# Patient Record
Sex: FEMALE | Race: WHITE
[De-identification: names, ages, dates, MRNs, and addresses within clinical notes are randomized per-mention and may not be internally consistent; named-entity substitution may affect disease eponyms.]

---

## 2019-01-05 ENCOUNTER — HOSPITAL ENCOUNTER (EMERGENCY)
Dept: HOSPITAL 97 - ER | Age: 34
Discharge: HOME | End: 2019-01-05
Payer: COMMERCIAL

## 2019-01-05 DIAGNOSIS — H81.10: Primary | ICD-10-CM

## 2019-01-05 PROCEDURE — 96374 THER/PROPH/DIAG INJ IV PUSH: CPT

## 2019-01-05 PROCEDURE — 96361 HYDRATE IV INFUSION ADD-ON: CPT

## 2019-01-05 PROCEDURE — 70450 CT HEAD/BRAIN W/O DYE: CPT

## 2019-01-05 PROCEDURE — 96375 TX/PRO/DX INJ NEW DRUG ADDON: CPT

## 2019-01-05 PROCEDURE — 81003 URINALYSIS AUTO W/O SCOPE: CPT

## 2019-01-05 PROCEDURE — 81025 URINE PREGNANCY TEST: CPT

## 2019-01-05 PROCEDURE — 99284 EMERGENCY DEPT VISIT MOD MDM: CPT

## 2019-01-05 NOTE — EDPHYS
Physician Documentation                                                                           

 [facility]                                                                

Name: Dominique Pedro                                                                               

Age: 33 yrs                                                                                       

Sex: Female                                                                                       

: 1985                                                                                   

MRN: [id number]                                                                                   

Arrival Date: 2019                                                                          

Time: 10:13                                                                                       

Account#: [id number]                                                                            

Bed 13                                                                                            

Private MD:                                                                                       

ED Physician Mathew Olivia                                                                      

HPI:                                                                                              

                                                                                             

11:00 This 33 yrs old  Female presents to ER via Ambulatory with complaints of       pm1 

      Headache, Dizziness, Numbness Of Face.                                                      

11:00 The patient complains of pain to the forehead. The patient describes the headache as    pm1 

      constant. Onset: The symptoms/episode began/occurred last night. Associated signs and       

      symptoms: Pertinent positives: Vertigo room spinning, Pertinent negatives: altered          

      mental status, fever, focal deficits. The symptoms are alleviated by closing eyes and       

      staying still the symptoms are aggravated by movement of head . The patient has not         

      experienced similar symptoms in the past. Patient with upper respiratory complaints,        

      cough, runny nose and sinus congestion for the past few days.                               

                                                                                                  

OB/GYN:                                                                                           

10:33 LMP 2018                                                                          aj  

                                                                                                  

Historical:                                                                                       

- Allergies:                                                                                      

10:33 No Known Allergies;                                                                     aj  

- Home Meds:                                                                                      

10:33 Trazodone Oral [Active];                                                                aj  

- PMHx:                                                                                           

10:33 None;                                                                                   aj  

- PSHx:                                                                                           

10:33 Appendectomy; ;                                                                aj  

                                                                                                  

- Immunization history:: Adult Immunizations up to date.                                          

- Social history:: Smoking status: Patient/guardian denies using tobacco.                         

- Ebola Screening: : Patient negative for fever greater than or equal to 101.5 degrees            

  Fahrenheit, and additional compatible Ebola Virus Disease symptoms Patient denies               

  exposure to infectious person Patient denies travel to an Ebola-affected area in the            

  21 days before illness onset No symptoms or risks identified at this time.                      

                                                                                                  

                                                                                                  

ROS:                                                                                              

11:00 Constitutional: Negative for fever, chills, and weight loss, Eyes: Negative for injury, pm1 

      pain, redness, and discharge, ENT: Negative for injury, pain, and discharge, Neck:          

      Negative for injury, pain, and swelling, Cardiovascular: Negative for chest pain,           

      palpitations, and edema, Respiratory: Negative for shortness of breath, cough,              

      wheezing, and pleuritic chest pain, Abdomen/GI: Negative for abdominal pain, nausea,        

      vomiting, diarrhea, and constipation, Back: Negative for injury and pain, : Negative      

      for injury, bleeding, discharge, and swelling, MS/Extremity: Negative for injury and        

      deformity, Skin: Negative for injury, rash, and discoloration.                              

11:00 Psych: Negative for depression, anxiety, suicide ideation, homicidal ideation, and          

      hallucinations.                                                                             

11:00 Neuro: Positive for vertigo, tingling of left side of face, Negative for altered mental     

      status.                                                                                     

                                                                                                  

Exam:                                                                                             

11:00 Constitutional:  This is a well developed, well nourished patient who is awake, alert,  pm1 

      and in no acute distress. Head/Face:  Normocephalic, atraumatic. Eyes:  Pupils equal        

      round and reactive to light, extra-ocular motions intact.  Lids and lashes normal.          

      Conjunctiva and sclera are non-icteric and not injected.  Cornea within normal limits.      

      Periorbital areas with no swelling, redness, or edema. ENT:  Nares patent. No nasal         

      discharge, no septal abnormalities noted.  Tympanic membranes are normal and external       

      auditory canals are clear.  Oropharynx with no redness, swelling, or masses, exudates,      

      or evidence of obstruction, uvula midline.  Mucous membranes moist. Neck:  Trachea          

      midline, no thyromegaly or masses palpated, and no cervical lymphadenopathy.  Supple,       

      full range of motion without nuchal rigidity, or vertebral point tenderness.  No            

      Meningismus. Chest/axilla:  Normal chest wall appearance and motion.  Nontender with no     

      deformity.  No lesions are appreciated. Cardiovascular:  Regular rate and rhythm with a     

      normal S1 and S2.  No gallops, murmurs, or rubs.  Normal PMI, no JVD.  No pulse             

      deficits. Respiratory:  Lungs have equal breath sounds bilaterally, clear to                

      auscultation and percussion.  No rales, rhonchi or wheezes noted.  No increased work of     

      breathing, no retractions or nasal flaring. Abdomen/GI:  Soft, non-tender, with normal      

      bowel sounds.  No distension or tympany.  No guarding or rebound.  No evidence of           

      tenderness throughout. Back:  No spinal tenderness.  No costovertebral tenderness.          

      Full range of motion. Skin:  Warm, dry with normal turgor.  Normal color with no            

      rashes, no lesions, and no evidence of cellulitis. MS/ Extremity:  Pulses equal, no         

      cyanosis.  Neurovascular intact.  Full, normal range of motion.                             

11:00 Neuro: Orientation: is normal, Mentation: is normal, Cranial nerves: normal except          

      vestibular nystagmus, Cerebellar function: normal finger to nose testing, heel to shin      

      testing is normal, Motor: moves all fours, strength is normal, strength is 5/5 in all       

      extremities, Sensation: is normal, no obvious gross deficits, Phani Hallpike test             

      positive.  Patient with rapid onset of vertigo, nausea with maneuver.  Patient closed       

      eyes and symptoms resolved in less than 15 seconds.                                         

                                                                                                  

Vital Signs:                                                                                      

10:33  / 78; Pulse 98; Resp 20; Temp 97.5; Pulse Ox 98% on R/A; Weight 83.91 kg; Height aj  

      5 ft. 5 in. (165.10 cm);                                                                    

12:04  / 81; Pulse 86; Resp 18; Temp 97.6(O); Pulse Ox 99% on R/A;                      mh5 

14:00  / 80; Pulse 79; Resp 16 S; Pulse Ox 100% on R/A;                                 jl7 

10:33 Body Mass Index 30.79 (83.91 kg, 165.10 cm)                                             aj  

                                                                                                  

MDM:                                                                                              

10:37 Patient medically screened.                                                             [facility] 

14:13 Data reviewed: vital signs. Data interpreted: Pulse oximetry: on room air is 99 %.      pm1 

      Interpretation: normal. Counseling: I had a detailed discussion with the patient and/or     

      guardian regarding: the historical points, exam findings, and any diagnostic results        

      supporting the discharge/admit diagnosis, radiology results, the need for outpatient        

      follow up, to return to the emergency department if symptoms worsen or persist or if        

      there are any questions or concerns that arise at home.                                     

                                                                                                  

                                                                                             

11:15 Order name: Urine Dipstick--Ancillary (enter results)                                     

                                                                                             

11:15 Order name: Urine Pregnancy--Ancillary (enter results); Complete Time: 12:12            ag  

                                                                                             

10:47 Order name: CT Head Brain wo Cont; Complete Time: 12:12                                 pm1 

                                                                                             

11:16 Order name: Urine Dipstick-Ancillary; Complete Time: 12:12                              EDMS

                                                                                             

10:47 Order name: Urine Dipstick-Ancillary (obtain specimen); Complete Time: 11:11            pm1 

                                                                                             

10:47 Order name: Urine Pregnancy Test (obtain specimen); Complete Time: 11:11                pm1 

                                                                                             

12:17 Order name: IV Saline Lock; Complete Time: 13:16                                        pm1 

                                                                                                  

Administered Medications:                                                                         

10:48 Drug: Zofran 4 mg Route: PO;                                                            jl7 

11:11 Drug: Valium 5 mg Route: PO;                                                            jl7 

12:00 Follow up: Response: No adverse reaction; No change in condition                        jl7 

12:50 Drug: Meclizine 50 mg Route: PO;                                                        jl7 

13:45 Follow up: Response: No adverse reaction; Marked relief of symptoms                     jl7 

13:15 Drug: TORadol 30 mg Route: IVP; Site: right upper arm;                                  jl7 

14:00 Follow up: Response: No adverse reaction; Pain is decreased                             jl7 

13:15 Drug: Decadron - Dexamethasone 10 mg Route: IVP; Site: right upper arm;                 iw  

13:45 Follow up: Response: No adverse reaction; Pain is decreased                             jl7 

13:15 Drug: NS 0.9% 1000 ml Route: IV; Rate: 1000 ml; Site: right upper arm;                  iw  

14:40 Follow up: Response: No adverse reaction; IV Status: Completed infusion                 jl7 

                                                                                                  

                                                                                                  

Disposition:                                                                                      

19 14:18 Discharged to Home. Impression: Benign paroxysmal vertigo, Headache.               

- Condition is Stable.                                                                            

- Discharge Instructions: Benign Positional Vertigo, General Headache Without Cause,              

  Easy-to-Read.                                                                                   

- Prescriptions for Meclizine 25 mg Oral Tablet - take 1 tablet by ORAL route every 8             

  hours As needed; 30 tablet.                                                                     

- Medication Reconciliation Form, Thank You Letter form.                                          

- Follow up: Emergency Department; When: As needed; Reason: Worsening of condition.               

  Follow up: Private Physician; When: 2 - 3 days; Reason: Recheck today's complaints,             

  Continuance of care, Re-evaluation by your physician.                                           

- Problem is new.                                                                                 

- Symptoms have improved.                                                                         

                                                                                                  

                                                                                                  

                                                                                                  

Addendum:                                                                                         

2019                                                                                        

     08:04 Co-signature as Attending Physician, Mathew Olivia MD I agree with the assessment and  c
ha

           plan of care.                                                                          

                                                                                                  

Signatures:                                                                                       

Dispatcher [employer]                           EDJulianna Cooper RN RN aj Anderson, Corey, MD MD cha Williams, Irene, RN RN iw Marinas, Patrick, NP                    NP   pm1                                                  

Kelly Iglesias RN RN   jl7                                                  

                                                                                                  

Corrections: (The following items were deleted from the chart)                                    

                                                                                             

14:41 14:18 2019 14:18 Discharged to Home. Impression: Benign paroxysmal vertigo;       jl7 

      Headache. Condition is Stable. Forms are Medication Reconciliation Form, Thank You          

      Letter, Antibiotic Education, Prescription Opioid Use. Follow up: Emergency Department;     

      When: As needed; Reason: Worsening of condition. Follow up: Private Physician; When: 2      

      - 3 days; Reason: Recheck today's complaints, Continuance of care, Re-evaluation by         

      your physician. Problem is new. Symptoms have improved. pm1                                 

                                                                                                  

**************************************************************************************************

## 2019-01-05 NOTE — ER
Nurse's Notes                                                                                     

 [facility]                                                                

Name: Dominique Pedro                                                                               

Age: 33 yrs                                                                                       

Sex: Female                                                                                       

: 1985                                                                                   

MRN: [id number]                                                                                   

Arrival Date: 2019                                                                          

Time: 10:13                                                                                       

Account#: [id number]                                                                            

Bed 13                                                                                            

Private MD:                                                                                       

Diagnosis: Benign paroxysmal vertigo;Headache                                                     

                                                                                                  

Presentation:                                                                                     

                                                                                             

10:31 Presenting complaint: Patient states: Numbness and tingling to left side of face that   aj  

      started last night at 2200 with dizziness and headache. Face is equal and symmetrical.      

      Patient ambulated to triage with steady gait. Drove herself and two minor children to       

      emergency department after going to urgent care this AM for this complaint. Transition      

      of care: patient was not received from another setting of care. Onset of symptoms was       

      2019 at 22:00. Risk Assessment: Do you want to hurt yourself or someone         

      else? Patient reports no desire to harm self or others. Initial Sepsis Screen: Does the     

      patient meet any 2 criteria? No. Patient's initial sepsis screen is negative. Does the      

      patient have a suspected source of infection? No. Patient's initial sepsis screen is        

      negative. Care prior to arrival: None.                                                      

10:31 Method Of Arrival: Ambulatory                                                             

10:31 Acuity: JEFFREY 3                                                                           aj  

                                                                                                  

Triage Assessment:                                                                                

10:33 Headache History: The patient has had previous headaches. General: Appears in no        aj  

      apparent distress. comfortable, Behavior is calm, cooperative, appropriate for age.         

      Pain: Complains of pain in face and scalp. Neuro: Level of Consciousness is awake,          

      alert, obeys commands, Oriented to person, place, time, situation, Appropriate for age      

       are equal bilaterally Moves all extremities. Full function Gait is steady, Speech     

      is normal, Facial symmetry appears normal, Pupils are PERRLA, Reports dizziness,            

      headache. Respiratory: Airway is patent Respiratory effort is even, unlabored,              

      Respiratory pattern is regular, symmetrical. Derm: Skin is intact, is healthy with good     

      turgor, Skin is pink, warm \T\ dry. normal.                                                 

                                                                                                  

OB/GYN:                                                                                           

10:33 LMP 2018                                                                          aj  

                                                                                                  

Historical:                                                                                       

- Allergies:                                                                                      

10:33 No Known Allergies;                                                                     aj  

- Home Meds:                                                                                      

10:33 Trazodone Oral [Active];                                                                aj  

- PMHx:                                                                                           

10:33 None;                                                                                   aj  

- PSHx:                                                                                           

10:33 Appendectomy; ;                                                                aj  

                                                                                                  

- Immunization history:: Adult Immunizations up to date.                                          

- Social history:: Smoking status: Patient/guardian denies using tobacco.                         

- Ebola Screening: : Patient negative for fever greater than or equal to 101.5 degrees            

  Fahrenheit, and additional compatible Ebola Virus Disease symptoms Patient denies               

  exposure to infectious person Patient denies travel to an Ebola-affected area in the            

  21 days before illness onset No symptoms or risks identified at this time.                      

                                                                                                  

                                                                                                  

Screenin:00 Abuse screen: Denies threats or abuse. Denies injuries from another. Nutritional        jl7 

      screening: No deficits noted. Tuberculosis screening: No symptoms or risk factors           

      identified. Fall Risk None identified.                                                      

                                                                                                  

Assessment:                                                                                       

11:00 General: Appears in no apparent distress. uncomfortable, Behavior is calm, cooperative, jl7 

      appropriate for age. Pain: Complains of pain in headache Pain does not radiate. Pain        

      currently is 8 out of 10 on a pain scale. Quality of pain is described as throbbing, Is     

      continuous. Neuro: Level of Consciousness is awake, alert, obeys commands, Oriented to      

      person, place, time, situation, Gait is steady, Reports dizziness. Cardiovascular:          

      Patient's skin is warm and dry. Respiratory: Airway is patent Respiratory effort is         

      even, unlabored, Respiratory pattern is regular, symmetrical. GI: Reports nausea. :       

      No signs and/or symptoms were reported regarding the genitourinary system. EENT: No         

      signs and/or symptoms were reported regarding the EENT system. Derm: Skin is pink, warm     

      \T\ dry.                                                                                    

12:00 Reassessment: Patient appears in no apparent distress at this time. No changes from     jl7 

      previously documented assessment. Patient and/or family updated on plan of care and         

      expected duration. Pain level reassessed. Patient is alert, oriented x 3, equal             

      unlabored respirations, skin warm/dry/pink.                                                 

13:00 Reassessment: Patient appears in no apparent distress at this time. No changes from     jl7 

      previously documented assessment. Patient and/or family updated on plan of care and         

      expected duration. Pain level reassessed. Patient is alert, oriented x 3, equal             

      unlabored respirations, skin warm/dry/pink.                                                 

14:00 Reassessment: Patient appears in no apparent distress at this time. Patient and/or      jl7 

      family updated on plan of care and expected duration. Pain level reassessed. Patient is     

      alert, oriented x 3, equal unlabored respirations, skin warm/dry/pink. Patient states       

      feeling better. Patient states symptoms have improved.                                      

                                                                                                  

Vital Signs:                                                                                      

10:33  / 78; Pulse 98; Resp 20; Temp 97.5; Pulse Ox 98% on R/A; Weight 83.91 kg; Height aj  

      5 ft. 5 in. (165.10 cm);                                                                    

12:04  / 81; Pulse 86; Resp 18; Temp 97.6(O); Pulse Ox 99% on R/A;                      mh5 

14:00  / 80; Pulse 79; Resp 16 S; Pulse Ox 100% on R/A;                                 jl7 

10:33 Body Mass Index 30.79 (83.91 kg, 165.10 cm)                                             aj  

                                                                                                  

ED Course:                                                                                        

10:13 Patient arrived in ED.                                                                  rg4 

10:33 Triage completed.                                                                       aj  

10:33 Arm band placed on left wrist. Patient placed in an exam room.                          aj  

10:35 Guillermo Huston NP is PHCP.                                                           pm1 

10:35 Mathew Olivia MD is Attending Physician.                                             pm1 

10:38 Kelly Iglesias RN is Primary Nurse.                                                      jl7 

11:00 Patient has correct armband on for positive identification. Bed in low position. Call   jl7 

      light in reach. Side rails up X 1. Pulse ox on. NIBP on.                                    

11:31 CT Head Brain wo Cont In Process Unspecified.                                           EDMS

11:51 CT completed. Patient moved to CT via wheelchair. Patient moved back from CT.           kw1 

13:15 Inserted saline lock: 22 gauge in right upper arm, using aseptic technique.             iw  

14:39 No provider procedures requiring assistance completed. IV discontinued, intact,         jl7 

      bleeding controlled, No redness/swelling at site. Pressure dressing applied.                

                                                                                                  

Administered Medications:                                                                         

10:48 Drug: Zofran 4 mg Route: PO;                                                            jl7 

11:11 Drug: Valium 5 mg Route: PO;                                                            jl7 

12:00 Follow up: Response: No adverse reaction; No change in condition                        jl7 

12:50 Drug: Meclizine 50 mg Route: PO;                                                        jl7 

13:45 Follow up: Response: No adverse reaction; Marked relief of symptoms                     jl7 

13:15 Drug: TORadol 30 mg Route: IVP; Site: right upper arm;                                  jl7 

14:00 Follow up: Response: No adverse reaction; Pain is decreased                             jl7 

13:15 Drug: Decadron - Dexamethasone 10 mg Route: IVP; Site: right upper arm;                 iw  

13:45 Follow up: Response: No adverse reaction; Pain is decreased                             jl7 

13:15 Drug: NS 0.9% 1000 ml Route: IV; Rate: 1000 ml; Site: right upper arm;                  iw  

14:40 Follow up: Response: No adverse reaction; IV Status: Completed infusion                 jl7 

                                                                                                  

                                                                                                  

Outcome:                                                                                          

14:18 Discharge ordered by MD.                                                                pm1 

14:39 Discharged to home ambulatory.                                                          jl7 

14:39 Condition: stable                                                                           

14:39 Discharge instructions given to patient, Instructed on discharge instructions, follow       

      up and referral plans. medication usage, Demonstrated understanding of instructions,        

      follow-up care, medications, Prescriptions given X 1.                                       

14:41 Patient left the ED.                                                                    jl7 

                                                                                                  

Signatures:                                                                                       

Dispatcher [employer]                           Julianna Tidwell RN RN aj Williams, Irene, RN RN iw Marinas, Patrick, NP                    NP   pm1                                                  

Clarisa Conway Maria mh5 Leal, Jahala, RN RN   jl7                                                  

Annabelle Ramos                            kw1                                                  

                                                                                                  

**************************************************************************************************

## 2019-01-05 NOTE — RAD REPORT
EXAM DESCRIPTION:  CT - Head Brain Wo Cont - 1/5/2019 11:31 am

 

CLINICAL HISTORY:  Left-sided numbness

 

COMPARISON:  None.

 

TECHNIQUE:  Computed axial tomography of the head was obtained. IV contrast was not requested.

 

All CT scans are performed using dose optimization technique as appropriate and may include automated
 exposure control or mA/KV adjustment according to patient size.

 

FINDINGS:  An intracranial  bleed is not seen .

 

The ventricles are normal in caliber.

 

No extra-axial fluid collection is noted.

 

Fluid within the sinuses/ mastoids is not seen.

 

IMPRESSION:  No acute intracranial abnormality is seen. If patient's symptoms persist  MRI of the bra
in would be recommended.

## 2019-05-14 ENCOUNTER — HOSPITAL ENCOUNTER (OUTPATIENT)
Dept: HOSPITAL 88 - OR | Age: 34
Setting detail: OBSERVATION
LOS: 1 days | Discharge: HOME | End: 2019-05-15
Attending: OTOLARYNGOLOGY | Admitting: OTOLARYNGOLOGY
Payer: COMMERCIAL

## 2019-05-14 VITALS — SYSTOLIC BLOOD PRESSURE: 114 MMHG | DIASTOLIC BLOOD PRESSURE: 60 MMHG

## 2019-05-14 VITALS — WEIGHT: 205.5 LBS | BODY MASS INDEX: 34.24 KG/M2 | HEIGHT: 65 IN

## 2019-05-14 VITALS — SYSTOLIC BLOOD PRESSURE: 113 MMHG | DIASTOLIC BLOOD PRESSURE: 66 MMHG

## 2019-05-14 VITALS — DIASTOLIC BLOOD PRESSURE: 66 MMHG | SYSTOLIC BLOOD PRESSURE: 113 MMHG

## 2019-05-14 VITALS — DIASTOLIC BLOOD PRESSURE: 60 MMHG | SYSTOLIC BLOOD PRESSURE: 114 MMHG

## 2019-05-14 DIAGNOSIS — E04.1: Primary | ICD-10-CM

## 2019-05-14 PROCEDURE — 36415 COLL VENOUS BLD VENIPUNCTURE: CPT

## 2019-05-14 PROCEDURE — 88307 TISSUE EXAM BY PATHOLOGIST: CPT

## 2019-05-14 PROCEDURE — 60212 PARTIAL THYROID EXCISION: CPT

## 2019-05-14 PROCEDURE — 88172 CYTP DX EVAL FNA 1ST EA SITE: CPT

## 2019-05-14 PROCEDURE — 82040 ASSAY OF SERUM ALBUMIN: CPT

## 2019-05-14 PROCEDURE — 88305 TISSUE EXAM BY PATHOLOGIST: CPT

## 2019-05-14 PROCEDURE — 82310 ASSAY OF CALCIUM: CPT

## 2019-05-14 PROCEDURE — 88331 PATH CONSLTJ SURG 1 BLK 1SPC: CPT

## 2019-05-14 PROCEDURE — 81025 URINE PREGNANCY TEST: CPT

## 2019-05-14 PROCEDURE — 88173 CYTOPATH EVAL FNA REPORT: CPT

## 2019-05-14 RX ADMIN — MEPERIDINE HYDROCHLORIDE PRN MG: 25 INJECTION, SOLUTION INTRAMUSCULAR; INTRAVENOUS; SUBCUTANEOUS at 21:10

## 2019-05-14 RX ADMIN — ACETAMINOPHEN AND CODEINE PHOSPHATE PRN EA: 300; 30 TABLET ORAL at 19:00

## 2019-05-14 RX ADMIN — SODIUM CHLORIDE, POTASSIUM CHLORIDE, SODIUM LACTATE AND CALCIUM CHLORIDE SCH MLS/HR: 600; 310; 30; 20 INJECTION, SOLUTION INTRAVENOUS at 16:15

## 2019-05-14 RX ADMIN — SULFAMETHOXAZOLE AND TRIMETHOPRIM SCH EA: 800; 160 TABLET ORAL at 21:00

## 2019-05-15 VITALS — DIASTOLIC BLOOD PRESSURE: 57 MMHG | SYSTOLIC BLOOD PRESSURE: 106 MMHG

## 2019-05-15 VITALS — SYSTOLIC BLOOD PRESSURE: 107 MMHG | DIASTOLIC BLOOD PRESSURE: 58 MMHG

## 2019-05-15 VITALS — DIASTOLIC BLOOD PRESSURE: 58 MMHG | SYSTOLIC BLOOD PRESSURE: 116 MMHG

## 2019-05-15 VITALS — SYSTOLIC BLOOD PRESSURE: 112 MMHG | DIASTOLIC BLOOD PRESSURE: 59 MMHG

## 2019-05-15 LAB
ALBUMIN SERPL-MCNC: 3.3 G/DL (ref 3.5–5)
CALCIUM SERPL-MCNC: 9.5 MG/DL (ref 8.4–10.2)

## 2019-05-15 RX ADMIN — MEPERIDINE HYDROCHLORIDE PRN MG: 25 INJECTION, SOLUTION INTRAMUSCULAR; INTRAVENOUS; SUBCUTANEOUS at 12:37

## 2019-05-15 RX ADMIN — SULFAMETHOXAZOLE AND TRIMETHOPRIM SCH EA: 800; 160 TABLET ORAL at 08:54

## 2019-05-15 RX ADMIN — PROMETHAZINE HYDROCHLORIDE PRN MLS/HR: 25 INJECTION, SOLUTION INTRAMUSCULAR; INTRAVENOUS at 09:55

## 2019-05-15 RX ADMIN — SODIUM CHLORIDE, POTASSIUM CHLORIDE, SODIUM LACTATE AND CALCIUM CHLORIDE SCH MLS/HR: 600; 310; 30; 20 INJECTION, SOLUTION INTRAVENOUS at 00:08

## 2019-05-15 RX ADMIN — MEPERIDINE HYDROCHLORIDE PRN MG: 25 INJECTION, SOLUTION INTRAMUSCULAR; INTRAVENOUS; SUBCUTANEOUS at 06:10

## 2019-05-15 RX ADMIN — ACETAMINOPHEN AND CODEINE PHOSPHATE PRN EA: 300; 30 TABLET ORAL at 01:45

## 2019-05-15 RX ADMIN — SODIUM CHLORIDE, POTASSIUM CHLORIDE, SODIUM LACTATE AND CALCIUM CHLORIDE SCH MLS/HR: 600; 310; 30; 20 INJECTION, SOLUTION INTRAVENOUS at 08:54

## 2019-05-15 RX ADMIN — PROMETHAZINE HYDROCHLORIDE PRN MLS/HR: 25 INJECTION, SOLUTION INTRAMUSCULAR; INTRAVENOUS at 01:45
